# Patient Record
Sex: MALE | Race: WHITE | NOT HISPANIC OR LATINO | Employment: OTHER | URBAN - METROPOLITAN AREA
[De-identification: names, ages, dates, MRNs, and addresses within clinical notes are randomized per-mention and may not be internally consistent; named-entity substitution may affect disease eponyms.]

---

## 2018-06-19 ENCOUNTER — APPOINTMENT (OUTPATIENT)
Dept: RADIOLOGY | Facility: MEDICAL CENTER | Age: 83
End: 2018-06-19
Attending: EMERGENCY MEDICINE
Payer: COMMERCIAL

## 2018-06-19 ENCOUNTER — HOSPITAL ENCOUNTER (EMERGENCY)
Facility: MEDICAL CENTER | Age: 83
End: 2018-06-19
Attending: EMERGENCY MEDICINE
Payer: COMMERCIAL

## 2018-06-19 VITALS
DIASTOLIC BLOOD PRESSURE: 61 MMHG | WEIGHT: 148.37 LBS | HEIGHT: 64 IN | RESPIRATION RATE: 14 BRPM | HEART RATE: 60 BPM | SYSTOLIC BLOOD PRESSURE: 133 MMHG | OXYGEN SATURATION: 95 % | TEMPERATURE: 97.9 F | BODY MASS INDEX: 25.33 KG/M2

## 2018-06-19 DIAGNOSIS — S51.011A SKIN TEAR OF RIGHT ELBOW WITHOUT COMPLICATION, INITIAL ENCOUNTER: ICD-10-CM

## 2018-06-19 DIAGNOSIS — S09.90XA CLOSED HEAD INJURY, INITIAL ENCOUNTER: Primary | ICD-10-CM

## 2018-06-19 DIAGNOSIS — S01.111A EYEBROW LACERATION, RIGHT, INITIAL ENCOUNTER: ICD-10-CM

## 2018-06-19 DIAGNOSIS — T14.8XXA MULTIPLE SKIN TEARS: ICD-10-CM

## 2018-06-19 PROCEDURE — 99283 EMERGENCY DEPT VISIT LOW MDM: CPT

## 2018-06-19 PROCEDURE — 303484 HCHG DRESSING LARGE

## 2018-06-19 PROCEDURE — 72125 CT NECK SPINE W/O DYE: CPT

## 2018-06-19 PROCEDURE — 70450 CT HEAD/BRAIN W/O DYE: CPT

## 2018-06-19 PROCEDURE — A6403 STERILE GAUZE>16 <= 48 SQ IN: HCPCS

## 2018-06-19 PROCEDURE — 700101 HCHG RX REV CODE 250: Performed by: EMERGENCY MEDICINE

## 2018-06-19 PROCEDURE — 303747 HCHG EXTRA SUTURE

## 2018-06-19 PROCEDURE — 304999 HCHG REPAIR-SIMPLE/INTERMED LEVEL 1

## 2018-06-19 RX ORDER — LIDOCAINE HYDROCHLORIDE AND EPINEPHRINE BITARTRATE 20; .01 MG/ML; MG/ML
10 INJECTION, SOLUTION SUBCUTANEOUS ONCE
Status: COMPLETED | OUTPATIENT
Start: 2018-06-19 | End: 2018-06-19

## 2018-06-19 RX ADMIN — LIDOCAINE HYDROCHLORIDE AND EPINEPHRINE 10 ML: 20; 10 INJECTION, SOLUTION INFILTRATION; PERINEURAL at 16:58

## 2018-06-19 ASSESSMENT — PAIN SCALES - GENERAL: PAINLEVEL_OUTOF10: 6

## 2018-06-19 NOTE — ED PROVIDER NOTES
"ED Provider Note    Scribed for Meghna Suarez M.D. by George Herrera. 6/19/2018, 4:22 PM.    Means of arrival: EMS  History obtained from: Patient and his daughter  History limited by: Cognitive decline    CHIEF COMPLAINT  Chief Complaint   Patient presents with   • GLF     patient with MGLF to day. -LOC   • Facial Laceration     right eyebrow laceration, bleeding controlled and dressing in place.    • Abrasion     right forearm skin tear       HPI  Ryan Cordova is a 84 y.o. male who presents to the Emergency Department for evaluation after he tripped and fell over uneven pavement outside of the [employer] outlet this afternoon. The patient struck his head on the concrete sidewalk. He suffered lacerations to his right side forehead and right arm. He arrived with bandages in place. The patient reports associated elbow pain and jaw pain. He denies headache, loss of consciousness, or vomiting. Patient is taking regular 81 mg aspirin. His tetanus shot is up to date. The patient lives in [place], [place], and his family is in town for [facility]. He has a history of short term memory loss. Patient does not have a significant history of infection. He has no known drug allergies.    REVIEW OF SYSTEMS  Pertinent positives include head injury, fall, lacerations, elbow pain, and jaw pain. Pertinent negatives include no headache, loss of consciousness, or vomiting.   See HPI for further details.   E    PAST MEDICAL HISTORY    Short term memory loss.    SURGICAL HISTORY  No recent surgeries.    SOCIAL HISTORY  Social History   Substance Use Topics   • Smoking status: None noted        • Alcohol use None noted      FAMILY HISTORY  None noted.    CURRENT MEDICATIONS  81 mg aspirin    ALLERGIES  He is allergic to steri strip adhesive    PHYSICAL EXAM  VITAL SIGNS: /75   Pulse 69   Temp 36.4 °C (97.5 °F)   Resp 16   Ht 1.626 m (5' 4\")   Wt 67.3 kg (148 lb 5.9 oz)   SpO2 97%   BMI 25.47 kg/m²   Vitals " reviewed.    Consitutional: Well-developed, well-nourished. Negative for: distress.  HENT: Normocephalic, Ecchymosis to the right lower eyelid, 2 cm jagged full thickness laceration to the lateral right eyebrow. Mildly tender over the right TMJ, Full range of motion to the mandible, right external ear normal, left external ear normal, oropharynx clear and moist. Cerumen impaction obscuring the left TM, No hemotympanum on the right  Eyes: PERRLA at 2 mm. Conjunctivae normal, negative for left and right eye discharge.  Neck: Range of motion normal, supple.  No tenderness to the C-spine.  Musculoskeletal: Normal range of motion of the right elbow  Neurological: Alert and oriented x3.  Sensation is intact bilateral hands  Skin: Warm, dry. Negative for: erythema, rash. 2 cm jagged full thickness laceration to the lateral right eyebrow with extruding adipose tissue and a devitalized skin island. Ecchymosis to the right lower eyelid, Small skin tear to the right posterior elbow, 12x6 cm skin tear over the lateral right elbow. On the dorsal left index and long finger there are two partial thickness skin tears. 2 cm on the index finger 5 mm on the long finger over the PIP joints.  Psych: Mood/affect normal, behavior normal.    DIAGNOSTIC STUDIES / PROCEDURES    PROCEDURES  Laceration Repair Procedure Note    Indication: Laceration    Procedure: The patient was placed in the appropriate position and anesthesia around the laceration was obtained by infiltration using 2% Lidocaine with epinephrine. The area was then cleansed with betadine and draped in a sterile fashion and debrided. The wound was explored and no foreign body/bodies was/were found. The laceration was closed with 5-0 Ethilon using interrupted sutures. There were no additional lacerations requiring repair. The wound area was then dressed with a sterile dressing and Coban.      Total repaired wound length: 2 cm.     Other Items: Suture count: 3    The patient  tolerated the procedure well.    Complications: None      RADIOLOGY  CT-HEAD W/O   Final Result      1.  Cerebral atrophy.      2.  White matter lucencies most consistent with small vessel ischemic change versus demyelination or gliosis.      3.  Otherwise, Head CT without contrast with no acute findings. No evidence of acute cerebral  hemorrhage or mass lesion.      CT-CSPINE WITHOUT PLUS RECONS   Final Result      No acute fracture cervical spine. Degenerative changes.        The radiologist's interpretation of all radiological studies have been reviewed by me.    COURSE & MEDICAL DECISION MAKING  Nursing notes, THERON YUENx reviewed in chart.    4:22 PM - Patient seen and examined at bedside. The patient presents with eyebrow laceration, multiple skin tears and the differential diagnosis includes but is not limited to intracranial hemorrhage, skull fracture. Ordered for CT head without contrast and CT C spine without plus recons to evaluate. Patient will be treated with lidocaine-epinephrine 2% [id number] injection 10 ml for his symptoms.  The patient lives in [place], [place]. We discussed symptoms associated with subdural hematoma including dizziness, poor balance, change in behavior, nausea, and vomiting. His daughter was asked to bring the patient to the ED if his bleeding continued and he will likely require a head CT at that time.  I reduced the distraction of the skin tears on the right elbow and the wound was cleaned and dressed with Xeroform and bulky Kerlix with Coban.    4:40 PM I performed a laceration repair at this time, as above. I discussed plans for discharge with a referral to his primary care for suture removal and instructed to return to the ED if his symptoms worsen. Patient understands and agrees.    The patient will return for new or worsening symptoms and is stable at the time of discharge.    DISPOSITION:  Patient will be discharged home in stable condition.    FOLLOW UP:  your physician in  [place]    In 6 days  For suture removal    FINAL IMPRESSION  1. Closed head injury, initial encounter    2. Eyebrow laceration, right, initial encounter    3. Skin tear of right elbow without complication, initial encounter    4. Multiple skin tears    5.      Laceration repair performed by ERP, as above.     George PEREZ (Scribe), am scribing for, and in the presence of, Meghna Suarez M.D..    Electronically signed by: George Herrera (Scribe), 6/19/2018    IMeghna M.D. personally performed the services described in this documentation, as scribed by George Herrera in my presence, and it is both accurate and complete.    The note accurately reflects work and decisions made by me.  Meghna Suarez  6/19/2018  8:43 PM

## 2018-06-19 NOTE — ED TRIAGE NOTES
".Ryan Cordova  .  Chief Complaint   Patient presents with   • GLF     patient with MGLF to day. -LOC   • Facial Laceration     right eyebrow laceration, bleeding controlled and dressing in place.    • Abrasion     right forearm skin tear     Patient to triage with above complaint. Patient alert and oriented. - LOC. Takes 81mg asa daily. ./75   Pulse 69   Temp 36.4 °C (97.5 °F)   Resp 16   Ht 1.626 m (5' 4\")   Wt 67.3 kg (148 lb 5.9 oz)   SpO2 97%   BMI 25.47 kg/m²     Patient to lobby and instructed to inform staff of any needs.  "

## 2018-06-20 NOTE — DISCHARGE INSTRUCTIONS
Laceration Care, Adult  A laceration is a cut that goes through all layers of the skin. The cut also goes into the tissue that is right under the skin. Some cuts heal on their own. Others need to be closed with stitches (sutures), staples, skin adhesive strips, or wound glue. Taking care of your cut lowers your risk of infection and helps your cut to heal better.  HOW TO TAKE CARE OF YOUR CUT  For stitches or staples:  · Keep the wound clean and dry.  · If you were given a bandage (dressing), you should change it at least one time per day or as told by your doctor. You should also change it if it gets wet or dirty.  · Keep the wound completely dry for the first 24 hours or as told by your doctor. After that time, you may take a shower or a bath. However, make sure that the wound is not soaked in water until after the stitches or staples have been removed.  · Clean the wound one time each day or as told by your doctor:  ¨ Wash the wound with soap and water.  ¨ Rinse the wound with water until all of the soap comes off.  ¨ Pat the wound dry with a clean towel. Do not rub the wound.  · After you clean the wound, put a thin layer of antibiotic ointment on it as told by your doctor. This ointment:  ¨ Helps to prevent infection.  ¨ Keeps the bandage from sticking to the wound.  · Have your stitches or staples removed as told by your doctor.  If your doctor used skin adhesive strips:   · Keep the wound clean and dry.  · If you were given a bandage, you should change it at least one time per day or as told by your doctor. You should also change it if it gets dirty or wet.  · Do not get the skin adhesive strips wet. You can take a shower or a bath, but be careful to keep the wound dry.  · If the wound gets wet, pat it dry with a clean towel. Do not rub the wound.  · Skin adhesive strips fall off on their own. You can trim the strips as the wound heals. Do not remove any strips that are still stuck to the wound. They will  fall off after a while.  If your doctor used wound glue:  · Try to keep your wound dry, but you may briefly wet it in the shower or bath. Do not soak the wound in water, such as by swimming.  · After you take a shower or a bath, gently pat the wound dry with a clean towel. Do not rub the wound.  · Do not do any activities that will make you really sweaty until the skin glue has fallen off on its own.  · Do not apply liquid, cream, or ointment medicine to your wound while the skin glue is still on.  · If you were given a bandage, you should change it at least one time per day or as told by your doctor. You should also change it if it gets dirty or wet.  · If a bandage is placed over the wound, do not let the tape for the bandage touch the skin glue.  · Do not pick at the glue. The skin glue usually stays on for 5-10 days. Then, it falls off of the skin.  General Instructions   · To help prevent scarring, make sure to cover your wound with sunscreen whenever you are outside after stitches are removed, after adhesive strips are removed, or when wound glue stays in place and the wound is healed. Make sure to wear a sunscreen of at least 30 SPF.  · Take over-the-counter and prescription medicines only as told by your doctor.  · If you were given antibiotic medicine or ointment, take or apply it as told by your doctor. Do not stop using the antibiotic even if your wound is getting better.  · Do not scratch or pick at the wound.  · Keep all follow-up visits as told by your doctor. This is important.  · Check your wound every day for signs of infection. Watch for:  ¨ Redness, swelling, or pain.  ¨ Fluid, blood, or pus.  · Raise (elevate) the injured area above the level of your heart while you are sitting or lying down, if possible.  GET HELP IF:  · You got a tetanus shot and you have any of these problems at the injection site:  ¨ Swelling.  ¨ Very bad pain.  ¨ Redness.  ¨ Bleeding.  · You have a fever.  · A wound that was  closed breaks open.  · You notice a bad smell coming from your wound or your bandage.  · You notice something coming out of the wound, such as wood or glass.  · Medicine does not help your pain.  · You have more redness, swelling, or pain at the site of your wound.  · You have fluid, blood, or pus coming from your wound.  · You notice a change in the color of your skin near your wound.  · You need to change the bandage often because fluid, blood, or pus is coming from the wound.  · You start to have a new rash.  · You start to have numbness around the wound.  GET HELP RIGHT AWAY IF:  · You have very bad swelling around the wound.  · Your pain suddenly gets worse and is very bad.  · You notice painful lumps near the wound or on skin that is anywhere on your body.  · You have a red streak going away from your wound.  · The wound is on your hand or foot and you cannot move a finger or toe like you usually can.  · The wound is on your hand or foot and you notice that your fingers or toes look pale or bluish.     This information is not intended to replace advice given to you by your health care provider. Make sure you discuss any questions you have with your health care provider.     Document Released: 06/05/2009 Document Revised: 05/03/2016 Document Reviewed: 12/14/2015  [employer] Interactive Patient Education ©2016 [employer]    Skin Tear Care  A skin tear is a wound in which the top layer of skin peels off. To repair the skin, your doctor may use:  · Tape.  · Skin adhesive strips.  A bandage (dressing) may also be placed over the tape or skin adhesive strips.  How to care for your skin tear  · Clean the wound as told by your doctor. You may be told to keep the wound dry for the first few days. If you are told to clean the wound:  ¨ Wash the wound with mild soap and water or a salt-water (saline) solution.  ¨ Rinse the wound with water to remove all soap.  ¨ Do not rub the wound dry. Let the wound air dry.  · Change  any dressings as told by your doctor. This includes changing the dressing if it gets wet, gets dirty, or starts to smell bad.  · Do not scratch or pick at the wound.  · Protect the injured area until it has healed.  · Check your wound every day for signs of infection. Check for:  ¨ More redness, swelling, or pain.  ¨ More fluid or blood.  ¨ Warmth.  ¨ Pus or a bad smell.  Medicines   · Take over-the-counter and prescription medicines only as told by your doctor.  · If you were prescribed an antibiotic medicine, take or apply it as told by your doctor. Do not stop using the antibiotic even if your condition gets better.  General instructions  · Keep the bandage dry as told by your doctor.  · Do not take baths, swim, or do anything that puts your wound underwater until your doctor says it is okay.  · Keep all follow-up visits as told by your doctor. This is important.  Contact a doctor if:  · You have more redness, swelling, or pain around your wound.  · You have more fluid or blood coming from your wound.  · Your wound feels warm to the touch.  · You have pus or a bad smell coming from your wound.  Get help right away if:  · You have a red streak that goes away from the skin tear.  · You have a fever and chills and your symptoms suddenly get worse.  This information is not intended to replace advice given to you by your health care provider. Make sure you discuss any questions you have with your health care provider.  Document Released: 09/26/2009 Document Revised: 08/13/2017 Document Reviewed: 11/07/2016  [employer] Interactive Patient Education © 2017 [employer]